# Patient Record
Sex: MALE | Race: WHITE | NOT HISPANIC OR LATINO | URBAN - METROPOLITAN AREA
[De-identification: names, ages, dates, MRNs, and addresses within clinical notes are randomized per-mention and may not be internally consistent; named-entity substitution may affect disease eponyms.]

---

## 2017-04-04 ENCOUNTER — OUTPATIENT (OUTPATIENT)
Dept: OUTPATIENT SERVICES | Facility: HOSPITAL | Age: 37
LOS: 1 days | Discharge: HOME | End: 2017-04-04

## 2017-06-27 DIAGNOSIS — R05 COUGH: ICD-10-CM

## 2017-10-17 ENCOUNTER — OUTPATIENT (OUTPATIENT)
Dept: OUTPATIENT SERVICES | Facility: HOSPITAL | Age: 37
LOS: 1 days | Discharge: HOME | End: 2017-10-17

## 2017-10-17 DIAGNOSIS — R07.9 CHEST PAIN, UNSPECIFIED: ICD-10-CM

## 2017-10-17 DIAGNOSIS — M54.9 DORSALGIA, UNSPECIFIED: ICD-10-CM

## 2017-11-29 ENCOUNTER — OUTPATIENT (OUTPATIENT)
Dept: OUTPATIENT SERVICES | Facility: HOSPITAL | Age: 37
LOS: 1 days | Discharge: HOME | End: 2017-11-29

## 2017-11-29 DIAGNOSIS — J18.9 PNEUMONIA, UNSPECIFIED ORGANISM: ICD-10-CM

## 2017-11-29 DIAGNOSIS — R05 COUGH: ICD-10-CM

## 2018-03-19 ENCOUNTER — OUTPATIENT (OUTPATIENT)
Dept: OUTPATIENT SERVICES | Facility: HOSPITAL | Age: 38
LOS: 1 days | Discharge: HOME | End: 2018-03-19

## 2018-03-19 DIAGNOSIS — R05 COUGH: ICD-10-CM

## 2018-03-19 DIAGNOSIS — M54.5 LOW BACK PAIN: ICD-10-CM

## 2018-03-19 DIAGNOSIS — R06.02 SHORTNESS OF BREATH: ICD-10-CM

## 2018-03-19 PROBLEM — Z00.00 ENCOUNTER FOR PREVENTIVE HEALTH EXAMINATION: Status: ACTIVE | Noted: 2018-03-19

## 2018-04-11 ENCOUNTER — NON-APPOINTMENT (OUTPATIENT)
Age: 38
End: 2018-04-11

## 2018-04-11 ENCOUNTER — APPOINTMENT (OUTPATIENT)
Dept: CARDIOLOGY | Facility: CLINIC | Age: 38
End: 2018-04-11

## 2018-04-11 VITALS
HEART RATE: 68 BPM | WEIGHT: 230 LBS | BODY MASS INDEX: 32.2 KG/M2 | DIASTOLIC BLOOD PRESSURE: 74 MMHG | SYSTOLIC BLOOD PRESSURE: 122 MMHG | HEIGHT: 71 IN

## 2018-04-11 DIAGNOSIS — R94.31 ABNORMAL ELECTROCARDIOGRAM [ECG] [EKG]: ICD-10-CM

## 2018-04-11 DIAGNOSIS — R07.89 OTHER CHEST PAIN: ICD-10-CM

## 2018-04-11 RX ORDER — PANTOPRAZOLE 40 MG/1
40 TABLET, DELAYED RELEASE ORAL DAILY
Qty: 30 | Refills: 2 | Status: ACTIVE | COMMUNITY

## 2018-04-11 RX ORDER — CETIRIZINE HCL 10 MG
TABLET ORAL
Refills: 0 | Status: ACTIVE | COMMUNITY

## 2018-04-11 RX ORDER — FLUTICASONE PROPIONATE 50 UG/1
50 SPRAY, METERED NASAL
Refills: 0 | Status: ACTIVE | COMMUNITY

## 2018-04-11 RX ORDER — ARIPIPRAZOLE 2 MG/1
2 TABLET ORAL DAILY
Refills: 0 | Status: ACTIVE | COMMUNITY

## 2021-09-22 ENCOUNTER — EMERGENCY (EMERGENCY)
Facility: HOSPITAL | Age: 41
LOS: 0 days | Discharge: HOME | End: 2021-09-22
Attending: EMERGENCY MEDICINE | Admitting: INTERNAL MEDICINE
Payer: MEDICARE

## 2021-09-22 VITALS
HEART RATE: 107 BPM | DIASTOLIC BLOOD PRESSURE: 67 MMHG | RESPIRATION RATE: 17 BRPM | OXYGEN SATURATION: 96 % | SYSTOLIC BLOOD PRESSURE: 140 MMHG | WEIGHT: 229.94 LBS | TEMPERATURE: 98 F

## 2021-09-22 VITALS — HEART RATE: 96 BPM

## 2021-09-22 DIAGNOSIS — M54.5 LOW BACK PAIN: ICD-10-CM

## 2021-09-22 PROCEDURE — 99283 EMERGENCY DEPT VISIT LOW MDM: CPT

## 2021-09-22 RX ORDER — IBUPROFEN 200 MG
1 TABLET ORAL
Qty: 42 | Refills: 0
Start: 2021-09-22 | End: 2021-10-05

## 2021-09-22 RX ORDER — METHOCARBAMOL 500 MG/1
1 TABLET, FILM COATED ORAL
Qty: 21 | Refills: 0
Start: 2021-09-22 | End: 2021-09-28

## 2021-09-22 NOTE — ED PROVIDER NOTE - MUSCULOSKELETAL, MLM
Spine appears normal, range of motion is not limited, no midline tenderness.  (+) Right lower lumbar tenderness with spasm.

## 2021-09-22 NOTE — ED PROVIDER NOTE - OBJECTIVE STATEMENT
41 y.o. male with a PMH of low back pain presented to the ER c/o Right low back pain radiating down Right leg for the past 2 days.  Pain worse with movement and alleviated with rest.  No trauma.  Denies fever, chills, abdominal pain, flank pain, chest pain, extremity weakness/paresthesias, bladder/bowel incontinence, saddle numbness, dysuria, hematuria, ataxia.  No other complaints.

## 2021-09-22 NOTE — ED PROVIDER NOTE - NSFOLLOWUPCLINICS_GEN_ALL_ED_FT
[facility] ([facility])  Rehabilitation  [facility] [address] [place] [address]  Phone: [phone]  Fax:   Follow Up Time: 1-3 Days

## 2021-09-22 NOTE — ED PROVIDER NOTE - PATIENT PORTAL LINK FT
You can access the FollowMyHealth Patient Portal offered by [facility] by registering at the following website: [email or web address]. By joining [facility]’s FollowMyHealth portal, you will also be able to view your health information using other applications (apps) compatible with our system.

## 2021-09-22 NOTE — ED PROVIDER NOTE - ATTENDING CONTRIBUTION TO CARE
42 yo m with pmh of chronic back pain, presents with R lower back pain since this morning.  pt says more pain on movement.  no abd pain, no urinary sx.  denies midline back pain.  no back pain red flags.  exam: nad, ncat, perrl, eomi, mmm, rrr, ctab, abd soft, nt,nd aox3, +ttp R paravert lumbar muscle, no midline t/l spine ttp, no step off imp: pt with acute on chronic lower back pain, nsaids and muscle relaxant

## 2021-09-22 NOTE — ED PROVIDER NOTE - NSFOLLOWUPINSTRUCTIONS_ED_ALL_ED_FT
Low Back Sprain    A sprain is a stretch or tear in the bands of tissue that hold bones and joints together (ligaments). Sprains of the lower back (lumbar spine) are a common cause of low back pain. A sprain occurs when ligaments are overextended or stretched beyond their limits. The ligaments can become inflamed, resulting in pain and sudden muscle tightening (spasms). A sprain can be caused by an injury (trauma), or it can develop gradually due to overuse.  There are three types of sprains:  Grade 1 is a mild sprain involving an overstretched ligament or a very slight tear of the ligament.Grade 2 is a moderate sprain involving a partial tear of the ligament.Grade 3 is a severe sprain involving a complete tear of the ligament.What are the causes?  This condition may be caused by:  Trauma, such as a fall or a hit to the body.Twisting or overstretching the back. This may result from doing activities that require a lot of energy, such as lifting heavy objects.What increases the risk?  The following factors may increase your risk of getting this condition:  Playing contact sports.Participating in sports or activities that put excessive stress on the back and require a lot of bending and twisting, including:  Lifting weights or heavy objects.Gymnastics.Soccer.Figure skating.Snowboarding.Being overweight or obese.Having poor strength and flexibility.What are the signs or symptoms?  Symptoms of this condition may include:  Sharp or dull pain in the lower back that does not go away. Pain may extend to the buttocks.Stiffness.Limited range of motion.Inability to stand up straight due to stiffness or pain.Muscle spasms.How is this diagnosed?  ImageThis condition may be diagnosed based on:  Your symptoms.Your medical history.A physical exam.  Your health care provider may push on certain areas of your back to determine the source of your pain.You may be asked to bend forward, backward, and side to side to assess the severity of your pain and your range of motion.Imaging tests, such as:  X-rays.MRI.How is this treated?  Treatment for this condition may include:  Applying heat and cold to the affected area.Medicines to help relieve pain and to relax your muscles (muscle relaxants).NSAIDs to help reduce swelling and discomfort.Physical therapy.When your symptoms improve, it is important to gradually return to your normal routine as soon as possible to reduce pain, avoid stiffness, and avoid loss of muscle strength. Generally, symptoms should improve within 6 weeks of treatment. However, recovery time varies.  Follow these instructions at home:  Managing pain, stiffness, and swelling     Image   If directed, apply ice to the injured area during the first 24 hours after your injury.  Put ice in a plastic bag.Place a towel between your skin and the bag.Leave the ice on for 20 minutes, 2–3 times a day.If directed, apply heat to the affected area as often as told by your health care provider. Use the heat source that your health care provider recommends, such as a moist heat pack or a heating pad.  Place a towel between your skin and the heat source.Leave the heat on for 20–30 minutes.Remove the heat if your skin turns bright red. This is especially important if you are unable to feel pain, heat, or cold. You may have a greater risk of getting burned.Activity     Rest and return to your normal activities as told by your health care provider. Ask your health care provider what activities are safe for you.Avoid activities that take a lot of effort (are strenuous) for as long as told by your health care provider.Do exercises as told by your health care provider.General instructions       Take over-the-counter and prescription medicines only as told by your health care provider.If you have questions or concerns about safety while taking pain medicine, talk with your health care provider.Do not drive or operate heavy machinery until you know how your pain medicine affects you.Do not use any tobacco products, such as cigarettes, chewing tobacco, and e-cigarettes. Tobacco can delay bone healing. If you need help quitting, ask your health care provider.Keep all follow-up visits as told by your health care provider. This is important.How is this prevented?  Warm up and stretch before being active.Cool down and stretch after being active.Give your body time to rest between periods of activity.Avoid:  Being physically inactive for long periods at a time.Exercising or playing sports when you are tired or in pain.Use correct form when playing sports and lifting heavy objects.Use good posture when sitting and standing.Maintain a healthy weight.Sleep on a mattress with medium firmness to support your back.Make sure to use equipment that fits you, including shoes that fit well.Be safe and responsible while being active to avoid falls.Do at least 150 minutes of moderate-intensity exercise each week, such as brisk walking or water aerobics. Try a form of exercise that takes stress off your back, such as swimming or stationary cycling.Maintain physical fitness, including:  Strength. In particular, develop and maintain strong abdominal muscles.Flexibility.Cardiovascular fitness.Endurance.Contact a health care provider if:  Your back pain does not improve after 6 weeks of treatment.Your symptoms get worse.Get help right away if:  Your back pain is severe.You are unable to stand or walk.You develop pain in your legs.You develop weakness in your buttocks or legs.You have difficulty controlling when you urinate or when you have a bowel movement.This information is not intended to replace advice given to you by your health care provider. Make sure you discuss any questions you have with your health care provider.    Document Released: 12/18/2006 Document Revised: 08/24/2017 Document Reviewed: 09/28/2016  [employer] Patient Education © 2019 [employer]

## 2021-09-22 NOTE — ED PROVIDER NOTE - CARE PROVIDERS DIRECT ADDRESSES
Call to ELO, the . She is going to call back. She was on the other line.     Pulmonary referral done for chronic cough, and nodules. Stress test orders in Epic.     Call back to Elo and gave her the #s to call. Dr Sun would like him to see Oncology. He states they have a Pulmonary nodule clinic now.        ,DirectAddress_Unknown

## 2025-05-03 ENCOUNTER — OUTPATIENT (OUTPATIENT)
Dept: OUTPATIENT SERVICES | Facility: HOSPITAL | Age: 45
LOS: 1 days | End: 2025-05-03
Payer: COMMERCIAL

## 2025-05-03 DIAGNOSIS — R05.9 COUGH, UNSPECIFIED: ICD-10-CM

## 2025-05-03 PROCEDURE — 71046 X-RAY EXAM CHEST 2 VIEWS: CPT

## 2025-05-03 PROCEDURE — 71046 X-RAY EXAM CHEST 2 VIEWS: CPT | Mod: 26

## 2025-05-04 DIAGNOSIS — R05.9 COUGH, UNSPECIFIED: ICD-10-CM
